# Patient Record
Sex: FEMALE | Race: WHITE
[De-identification: names, ages, dates, MRNs, and addresses within clinical notes are randomized per-mention and may not be internally consistent; named-entity substitution may affect disease eponyms.]

---

## 2020-01-14 ENCOUNTER — HOSPITAL ENCOUNTER (EMERGENCY)
Dept: HOSPITAL 11 - JP.ED | Age: 50
Discharge: HOME | End: 2020-01-14
Payer: COMMERCIAL

## 2020-01-14 DIAGNOSIS — N93.9: Primary | ICD-10-CM

## 2020-01-14 DIAGNOSIS — Z88.2: ICD-10-CM

## 2020-01-14 NOTE — EDM.PDOC
ED HPI GENERAL MEDICAL PROBLEM





- General


Chief Complaint: OB/GYN Problem


Stated Complaint: LARGE CLOT AND CRAMPING


Time Seen by Provider: 01/14/20 10:56


Source of Information: Reports: Patient, Family, Old Records, RN Notes Reviewed


History Limitations: Reports: No Limitations





- History of Present Illness


INITIAL COMMENTS - FREE TEXT/NARRATIVE: 





49-year-old female presents emergency department a complaint of vaginal bleeding

, she states she has not gone through menopause yet had been fairly regular on 

her periods however because of some recent travel she had switched to birth 

control pill had taken 2 full months of birth control pills and now is in the 

withdrawal phase she developed heavy cramping passed large amount of blood 

heavy flow large clot and then passed tissue which appears to be similar to 

endometrial.  At this time the cramping has resolved the flow has slowed down 

and has no complaints at this time.





- Related Data


 Allergies











Allergy/AdvReac Type Severity Reaction Status Date / Time


 


Sulfa (Sulfonamide Allergy  Rash Verified 01/14/20 10:48





Antibiotics)     











Home Meds: 


 Home Meds





Levonorgestrel-Ethin Estradiol [Jolessa 0.15 mg-0.03 mg Tablet] 1 tab PO DAILY 

01/14/20 [History]











Past Medical History


OB/GYN History: Reports: Pregnancy


Oncologic (Cancer) History: Reports: Basal Cell Carcinoma


Dermatologic History: Reports: Melanoma





- Infectious Disease History


Infectious Disease History: Reports: Chicken Pox





- Past Surgical History


Musculoskeletal Surgical History: Reports: Arthroscopic Knee





Social & Family History





- Tobacco Use


Smoking Status *Q: Never Smoker





- Caffeine Use


Caffeine Use: Reports: Coffee





- Recreational Drug Use


Recreational Drug Use: No





ED ROS GENERAL





- Review of Systems


Review Of Systems: See Below


Constitutional: Reports: No Symptoms


Respiratory: Reports: No Symptoms


Cardiovascular: Reports: No Symptoms


GI/Abdominal: Reports: No Symptoms


: Reports: Irregular Menses





ED EXAM, GENERAL





- Physical Exam


Exam: See Below


Exam Limited By: No Limitations


General Appearance: Alert, WD/WN, No Apparent Distress


Respiratory/Chest: No Respiratory Distress





Course





- Vital Signs


Last Recorded V/S: 


 Last Vital Signs











Temp  96.7 F   01/14/20 10:47


 


Pulse  54 L  01/14/20 10:47


 


Resp  16   01/14/20 10:47


 


BP  136/53 L  01/14/20 10:47


 


Pulse Ox  99   01/14/20 10:47














- Orders/Labs/Meds


Labs: 


 Laboratory Tests











  01/14/20 Range/Units





  11:32 


 


Urine HCG, Qual  Negative  














Departure





- Departure


Time of Disposition: 12:31


Disposition: Home, Self-Care 01


Condition: Fair


Clinical Impression: 


 Vaginal bleeding








- Discharge Information


Referrals: 


Malik Schwab MD [Primary Care Provider] - 


Forms:  ED Department Discharge


Additional Instructions: 


Continue with birth control pills,  please followup with your primary care 

provider in 3-5 days if not better, please call return to the emergency 

department with worsening of symptoms.





Sepsis Event Note





- Evaluation


Sepsis Screening Result: No Definite Risk





- Focused Exam


Vital Signs: 


 Vital Signs











  Temp Pulse Resp BP Pulse Ox


 


 01/14/20 10:47  96.7 F  54 L  16  136/53 L  99


 


 01/14/20 10:43  96.7 F  54 L  16  136/53 L  99











Date Exam was Performed: 01/14/20


Time Exam was Performed: 12:29





- Assessment/Plan


Plan: 





Assessment





Acuity = acute





Site and laterality = endometrial cast





Etiology  = unknown





Manifestations = vaginal bleeding





Location of injury =  Home





Lab values = beta-hCG was negative





Plan


Call discussed case with Dr. [facility] recommended pregnancy tests follow-

up in 3 to 5 days if no improvement and then continue with birth control pills

















 This note was dictated using dragon voice recognition software please call 

with any questions on syntax or grammar.

## 2021-09-23 ENCOUNTER — HOSPITAL ENCOUNTER (OUTPATIENT)
Dept: HOSPITAL 11 - JP.SDS | Age: 51
Discharge: HOME | End: 2021-09-23
Attending: SURGERY
Payer: COMMERCIAL

## 2021-09-23 DIAGNOSIS — Z88.2: ICD-10-CM

## 2021-09-23 DIAGNOSIS — K57.30: ICD-10-CM

## 2021-09-23 DIAGNOSIS — Z12.11: Primary | ICD-10-CM

## 2021-09-23 PROCEDURE — 45378 DIAGNOSTIC COLONOSCOPY: CPT

## 2021-09-24 NOTE — OR
DATE OF PROCEDURE:  09/23/2021

 

SURGEON:  Taiwo Srivastava MD

 

PROCEDURE PERFORMED:  Colonoscopy.

 

FINDINGS:  Diverticulosis, mild, mostly limited to sigmoid colon.

 

COMPLICATIONS:  None.

 

ASSISTANT:  None.

 

ANESTHESIA:  MAC.

 

PREOPERATIVE DIAGNOSIS:  Screening colonoscopy.

 

POSTOPERATIVE DIAGNOSIS:  Screening colonoscopy.

 

RISKS:  Risks, benefits, alternatives, and limitations including, but not limited to

infection, bleeding, perforation, false positives, and false negatives were explained to the

patient who wished to proceed.

 

PROCEDURE IN DETAIL:  The patient was placed in left lateral decubitus position.  Digital

rectal exam was performed without abnormality.  Scope was introduced and advanced

atraumatically to the ileocecal valve.  A photo was taken of the appendiceal orifice.

 

The scope was brought back to the ascending, transverse, descending colon, and retroflexed.

 

No evidence of old or new blood.  No masses.  No polyps.

 

No colitis.

 

The diverticulosis would be described as mild to very mild with most of it in the sigmoid

colon, but some extending throughout the entire colon.  There was no evidence of

diverticulitis or bleeding.

 

No abnormalities on retroflex.

 

Greater than 8 minutes was spent on removing the scope.

 

The prep was acceptable.  Approximately 95% of the luminal surface could be seen.  The

patient tolerated the procedure well.

 

 

 

 

Taiwo Srivastava MD

DD:  09/23/2021 08:55:33

DT:  09/23/2021 12:09:28

Job #:  [id number]

## 2023-10-17 ENCOUNTER — HOSPITAL ENCOUNTER (OUTPATIENT)
Dept: HOSPITAL 11 - JP.SDS | Age: 53
Discharge: HOME | End: 2023-10-17
Attending: FAMILY MEDICINE
Payer: COMMERCIAL

## 2023-10-17 DIAGNOSIS — K62.1: ICD-10-CM

## 2023-10-17 DIAGNOSIS — Z88.2: ICD-10-CM

## 2023-10-17 DIAGNOSIS — D50.9: ICD-10-CM

## 2023-10-17 DIAGNOSIS — K52.9: Primary | ICD-10-CM

## 2023-10-17 DIAGNOSIS — K63.5: ICD-10-CM

## 2023-10-17 DIAGNOSIS — K57.30: ICD-10-CM

## 2023-10-17 DIAGNOSIS — Z98.890: ICD-10-CM

## 2023-10-17 LAB
HCT VFR BLD AUTO: 25.3 % (ref 34.3–46)
HGB BLD-MCNC: 8.1 G/DL (ref 11.2–15.5)
MCH RBC QN AUTO: 27.9 PG (ref 31.6–35.5)
MCHC RBC AUTO-ENTMCNC: 32 G/DL (ref 31.6–35.5)
MCHC RBC AUTO-ENTMCNC: 87.2 FL (ref 81.4–99)
PLATELET # BLD AUTO: 274 K/UL (ref 130–375)
RBC # BLD AUTO: 2.9 M/UL (ref 3.77–5.24)
WBC # BLD AUTO: 5.6 K/UL (ref 3.2–11)

## 2023-10-17 PROCEDURE — 36415 COLL VENOUS BLD VENIPUNCTURE: CPT

## 2023-10-17 PROCEDURE — 45380 COLONOSCOPY AND BIOPSY: CPT

## 2023-10-17 PROCEDURE — 85027 COMPLETE CBC AUTOMATED: CPT
